# Patient Record
Sex: FEMALE | Race: OTHER | NOT HISPANIC OR LATINO
[De-identification: names, ages, dates, MRNs, and addresses within clinical notes are randomized per-mention and may not be internally consistent; named-entity substitution may affect disease eponyms.]

---

## 2017-08-09 ENCOUNTER — RESULT REVIEW (OUTPATIENT)
Age: 37
End: 2017-08-09

## 2017-10-04 ENCOUNTER — RESULT REVIEW (OUTPATIENT)
Age: 37
End: 2017-10-04

## 2018-06-01 ENCOUNTER — MEDICATION RENEWAL (OUTPATIENT)
Age: 38
End: 2018-06-01

## 2018-06-05 ENCOUNTER — MEDICATION RENEWAL (OUTPATIENT)
Age: 38
End: 2018-06-05

## 2018-11-12 ENCOUNTER — RX RENEWAL (OUTPATIENT)
Age: 38
End: 2018-11-12

## 2018-11-12 DIAGNOSIS — J02.9 ACUTE PHARYNGITIS, UNSPECIFIED: ICD-10-CM

## 2018-12-25 ENCOUNTER — TRANSCRIPTION ENCOUNTER (OUTPATIENT)
Age: 38
End: 2018-12-25

## 2019-01-02 RX ORDER — AMOXICILLIN 500 MG/1
500 TABLET, FILM COATED ORAL
Qty: 14 | Refills: 0 | Status: DISCONTINUED | COMMUNITY
Start: 2019-01-02 | End: 2019-01-02

## 2019-01-02 RX ORDER — AMOXICILLIN 500 MG/1
500 TABLET, FILM COATED ORAL
Qty: 2 | Refills: 0 | Status: DISCONTINUED | COMMUNITY
Start: 2019-01-02 | End: 2019-01-02

## 2019-06-10 ENCOUNTER — RX RENEWAL (OUTPATIENT)
Age: 39
End: 2019-06-10

## 2019-09-30 ENCOUNTER — RX RENEWAL (OUTPATIENT)
Age: 39
End: 2019-09-30

## 2019-10-31 ENCOUNTER — RESULT REVIEW (OUTPATIENT)
Age: 39
End: 2019-10-31

## 2020-04-25 ENCOUNTER — MESSAGE (OUTPATIENT)
Age: 40
End: 2020-04-25

## 2020-05-06 ENCOUNTER — APPOINTMENT (OUTPATIENT)
Dept: DISASTER EMERGENCY | Facility: CLINIC | Age: 40
End: 2020-05-06

## 2020-05-07 LAB
SARS-COV-2 IGG SERPL IA-ACNC: 1 AU/ML
SARS-COV-2 IGG SERPL QL IA: NEGATIVE

## 2020-10-21 ENCOUNTER — APPOINTMENT (OUTPATIENT)
Dept: ORTHOPEDIC SURGERY | Facility: CLINIC | Age: 40
End: 2020-10-21

## 2020-12-21 PROBLEM — J02.9 THROAT INFECTION: Status: RESOLVED | Noted: 2019-01-02 | Resolved: 2020-12-21

## 2020-12-22 ENCOUNTER — RESULT REVIEW (OUTPATIENT)
Age: 40
End: 2020-12-22

## 2021-03-24 ENCOUNTER — APPOINTMENT (OUTPATIENT)
Dept: MAMMOGRAPHY | Facility: HOSPITAL | Age: 41
End: 2021-03-24
Payer: COMMERCIAL

## 2021-03-24 ENCOUNTER — OUTPATIENT (OUTPATIENT)
Dept: OUTPATIENT SERVICES | Facility: HOSPITAL | Age: 41
LOS: 1 days | End: 2021-03-24
Payer: COMMERCIAL

## 2021-03-24 DIAGNOSIS — Z12.31 ENCOUNTER FOR SCREENING MAMMOGRAM FOR MALIGNANT NEOPLASM OF BREAST: ICD-10-CM

## 2021-03-24 PROCEDURE — 77067 SCR MAMMO BI INCL CAD: CPT

## 2021-03-24 PROCEDURE — 77063 BREAST TOMOSYNTHESIS BI: CPT

## 2021-03-24 PROCEDURE — 77063 BREAST TOMOSYNTHESIS BI: CPT | Mod: 26

## 2021-03-24 PROCEDURE — 77067 SCR MAMMO BI INCL CAD: CPT | Mod: 26

## 2021-04-02 ENCOUNTER — APPOINTMENT (OUTPATIENT)
Dept: MAMMOGRAPHY | Facility: HOSPITAL | Age: 41
End: 2021-04-02
Payer: COMMERCIAL

## 2021-04-02 ENCOUNTER — APPOINTMENT (OUTPATIENT)
Dept: ULTRASOUND IMAGING | Facility: HOSPITAL | Age: 41
End: 2021-04-02
Payer: COMMERCIAL

## 2021-04-02 ENCOUNTER — OUTPATIENT (OUTPATIENT)
Dept: OUTPATIENT SERVICES | Facility: HOSPITAL | Age: 41
LOS: 1 days | End: 2021-04-02
Payer: COMMERCIAL

## 2021-04-02 DIAGNOSIS — Z00.8 ENCOUNTER FOR OTHER GENERAL EXAMINATION: ICD-10-CM

## 2021-04-02 PROCEDURE — G0279: CPT | Mod: 26

## 2021-04-02 PROCEDURE — 76641 ULTRASOUND BREAST COMPLETE: CPT

## 2021-04-02 PROCEDURE — 77066 DX MAMMO INCL CAD BI: CPT | Mod: 26

## 2021-04-02 PROCEDURE — 77066 DX MAMMO INCL CAD BI: CPT

## 2021-04-02 PROCEDURE — 76641 ULTRASOUND BREAST COMPLETE: CPT | Mod: 26,50

## 2021-04-02 PROCEDURE — G0279: CPT

## 2021-05-10 ENCOUNTER — TRANSCRIPTION ENCOUNTER (OUTPATIENT)
Age: 41
End: 2021-05-10

## 2021-05-10 ENCOUNTER — APPOINTMENT (OUTPATIENT)
Dept: OPHTHALMOLOGY | Facility: CLINIC | Age: 41
End: 2021-05-10
Payer: COMMERCIAL

## 2021-05-10 ENCOUNTER — NON-APPOINTMENT (OUTPATIENT)
Age: 41
End: 2021-05-10

## 2021-05-10 PROCEDURE — 92002 INTRM OPH EXAM NEW PATIENT: CPT

## 2021-05-10 PROCEDURE — 99072 ADDL SUPL MATRL&STAF TM PHE: CPT

## 2021-06-25 DIAGNOSIS — L30.9 DERMATITIS, UNSPECIFIED: ICD-10-CM

## 2021-06-25 DIAGNOSIS — L81.1 CHLOASMA: ICD-10-CM

## 2021-06-25 RX ORDER — HYDROQUINONE 40 MG/G
4 CREAM TOPICAL TWICE DAILY
Qty: 1 | Refills: 0 | Status: ACTIVE | COMMUNITY
Start: 2021-06-25 | End: 1900-01-01

## 2021-06-25 RX ORDER — CLOBETASOL PROPIONATE 0.05 G/ML
0.05 SPRAY TOPICAL TWICE DAILY
Qty: 1 | Refills: 0 | Status: ACTIVE | COMMUNITY
Start: 2021-06-25 | End: 1900-01-01

## 2021-08-05 ENCOUNTER — APPOINTMENT (OUTPATIENT)
Dept: GASTROENTEROLOGY | Facility: CLINIC | Age: 41
End: 2021-08-05
Payer: COMMERCIAL

## 2021-08-05 VITALS
HEIGHT: 66 IN | HEART RATE: 81 BPM | DIASTOLIC BLOOD PRESSURE: 68 MMHG | WEIGHT: 151 LBS | SYSTOLIC BLOOD PRESSURE: 100 MMHG | OXYGEN SATURATION: 99 % | TEMPERATURE: 97.8 F | BODY MASS INDEX: 24.27 KG/M2

## 2021-08-05 DIAGNOSIS — R11.0 NAUSEA: ICD-10-CM

## 2021-08-05 PROCEDURE — 99203 OFFICE O/P NEW LOW 30 MIN: CPT

## 2021-08-05 RX ORDER — AZELASTINE HYDROCHLORIDE 137 UG/1
137 SPRAY, METERED NASAL
Qty: 1 | Refills: 3 | Status: DISCONTINUED | COMMUNITY
Start: 2019-09-30 | End: 2021-08-05

## 2021-08-05 RX ORDER — MULTIVIT-MIN/IRON/FOLIC ACID/K 18-600-40
CAPSULE ORAL
Refills: 0 | Status: ACTIVE | COMMUNITY

## 2021-08-05 RX ORDER — AMOXICILLIN 500 MG/1
500 TABLET, FILM COATED ORAL 3 TIMES DAILY
Qty: 21 | Refills: 0 | Status: DISCONTINUED | COMMUNITY
Start: 2019-01-02 | End: 2021-08-05

## 2021-08-05 RX ORDER — CHOLECALCIFEROL (VITAMIN D3) 25 MCG
TABLET ORAL
Refills: 0 | Status: ACTIVE | COMMUNITY

## 2021-08-05 RX ORDER — COLD-HOT PACK
EACH MISCELLANEOUS
Refills: 0 | Status: ACTIVE | COMMUNITY

## 2021-08-05 RX ORDER — AZITHROMYCIN 250 MG/1
250 TABLET, FILM COATED ORAL
Qty: 1 | Refills: 0 | Status: DISCONTINUED | OUTPATIENT
Start: 2018-06-01 | End: 2021-08-05

## 2021-08-05 RX ORDER — AZITHROMYCIN 250 MG/1
250 TABLET, FILM COATED ORAL
Qty: 1 | Refills: 0 | Status: DISCONTINUED | COMMUNITY
Start: 2018-06-05 | End: 2021-08-05

## 2021-08-05 RX ORDER — AZELASTINE HYDROCHLORIDE 137 UG/1
0.1 SPRAY, METERED NASAL TWICE DAILY
Qty: 30 | Refills: 0 | Status: DISCONTINUED | COMMUNITY
Start: 2018-01-19 | End: 2021-08-05

## 2021-08-05 RX ORDER — FLUCONAZOLE 150 MG/1
150 TABLET ORAL
Qty: 3 | Refills: 0 | Status: DISCONTINUED | COMMUNITY
Start: 2018-11-12 | End: 2021-08-05

## 2021-08-06 PROBLEM — R11.0 NAUSEA: Status: ACTIVE | Noted: 2021-08-06

## 2021-08-06 NOTE — ASSESSMENT
[FreeTextEntry1] : Patient with worsened symptoms of acid reflux including burning, nausea, and globus sensation.\par \par A list of dietary and lifestyle modifications in the treatment of GERD was given to the patient.\par \par The patient was empirically started on pantoprazole 40 mg a day.\par \par An EGD has been scheduled. The risks, benefits, alternatives, and limitations of the procedure were explained.

## 2021-08-06 NOTE — HISTORY OF PRESENT ILLNESS
[FreeTextEntry1] : The patient is a 40-year-old woman who has had symptoms of reflux in the past but has had worsened symptoms dating back for about a month.  She has had epigastric burning that extends up her throat.  She takes Tums with minimal relief.  She also notes nausea but no vomiting and a globus sensation.  She denies dysphagia.  Bowel movements are normal.  She moves her bowels every other day which is a mild change from previously when she was moving her bowels daily.  She denies melena or bright red blood per rectum.  The patient's weight is stable.  The patient has not been admitted to the hospital in the past year and denies any cardiac issues.

## 2021-08-09 DIAGNOSIS — R19.4 CHANGE IN BOWEL HABIT: ICD-10-CM

## 2021-08-09 DIAGNOSIS — Z20.822 CONTACT WITH AND (SUSPECTED) EXPOSURE TO COVID-19: ICD-10-CM

## 2021-08-09 DIAGNOSIS — Z01.818 ENCOUNTER FOR OTHER PREPROCEDURAL EXAMINATION: ICD-10-CM

## 2021-08-31 LAB — SARS-COV-2 N GENE NPH QL NAA+PROBE: NOT DETECTED

## 2021-09-02 ENCOUNTER — APPOINTMENT (OUTPATIENT)
Dept: GASTROENTEROLOGY | Facility: AMBULATORY MEDICAL SERVICES | Age: 41
End: 2021-09-02
Payer: COMMERCIAL

## 2021-09-02 PROCEDURE — 43239 EGD BIOPSY SINGLE/MULTIPLE: CPT | Mod: 59

## 2021-10-26 ENCOUNTER — APPOINTMENT (OUTPATIENT)
Dept: GASTROENTEROLOGY | Facility: CLINIC | Age: 41
End: 2021-10-26
Payer: COMMERCIAL

## 2021-10-26 VITALS
DIASTOLIC BLOOD PRESSURE: 60 MMHG | OXYGEN SATURATION: 99 % | SYSTOLIC BLOOD PRESSURE: 112 MMHG | WEIGHT: 156 LBS | TEMPERATURE: 97.3 F | HEIGHT: 66 IN | HEART RATE: 71 BPM | BODY MASS INDEX: 25.07 KG/M2

## 2021-10-26 DIAGNOSIS — K21.9 GASTRO-ESOPHAGEAL REFLUX DISEASE W/OUT ESOPHAGITIS: ICD-10-CM

## 2021-10-26 PROCEDURE — 99213 OFFICE O/P EST LOW 20 MIN: CPT

## 2021-10-27 NOTE — ASSESSMENT
[FreeTextEntry1] : Patient with biopsy evidence of reflux esophagitis who is doing better on pantoprazole 40 mg a day.\par \par We will decrease pantoprazole to 20 mg a day.\par \par Patient will call me if her symptoms return on the lower dosage.  Otherwise, she will return to see me in 6 months.\par \par Patient is due for colonoscopy in 5 to 10 years.\par \par \par Plan from 8/5/2021 - Patient with worsened symptoms of acid reflux including burning, nausea, and globus sensation.\par \par A list of dietary and lifestyle modifications in the treatment of GERD was given to the patient.\par \par The patient was empirically started on pantoprazole 40 mg a day.\par \par An EGD has been scheduled. The risks, benefits, alternatives, and limitations of the procedure were explained.

## 2021-10-27 NOTE — HISTORY OF PRESENT ILLNESS
[FreeTextEntry1] : We reviewed the patient's EGD and colonoscopy performed on September 2, 2021.  EGD was grossly normal with biopsies showing evidence of reflux esophagitis.  Colonoscopy was normal.  The patient has been on pantoprazole 40 mg a day and is feeling better.  She denies heartburn or dysphagia.  She no longer has a globus sensation or throat burning.  She denies epigastric pain.  Bowel movements are normal with 1 bowel movement every 2 days.  She denies melena or bright red blood per rectum.  Patient has cut down her alcohol intake.\par \par \par Note from 8/5/2021 - The patient is a 40-year-old woman who has had symptoms of reflux in the past but has had worsened symptoms dating back for about a month.  She has had epigastric burning that extends up her throat.  She takes Tums with minimal relief.  She also notes nausea but no vomiting and a globus sensation.  She denies dysphagia.  Bowel movements are normal.  She moves her bowels every other day which is a mild change from previously when she was moving her bowels daily.  She denies melena or bright red blood per rectum.  The patient's weight is stable.  The patient has not been admitted to the hospital in the past year and denies any cardiac issues.

## 2021-10-29 ENCOUNTER — APPOINTMENT (OUTPATIENT)
Dept: ULTRASOUND IMAGING | Facility: CLINIC | Age: 41
End: 2021-10-29
Payer: COMMERCIAL

## 2021-10-29 PROCEDURE — 76642 ULTRASOUND BREAST LIMITED: CPT | Mod: 50

## 2022-04-19 ENCOUNTER — TRANSCRIPTION ENCOUNTER (OUTPATIENT)
Age: 42
End: 2022-04-19

## 2022-04-20 ENCOUNTER — NON-APPOINTMENT (OUTPATIENT)
Age: 42
End: 2022-04-20

## 2022-04-28 ENCOUNTER — APPOINTMENT (OUTPATIENT)
Dept: GASTROENTEROLOGY | Facility: CLINIC | Age: 42
End: 2022-04-28
Payer: COMMERCIAL

## 2022-04-28 VITALS
SYSTOLIC BLOOD PRESSURE: 100 MMHG | WEIGHT: 152 LBS | OXYGEN SATURATION: 98 % | BODY MASS INDEX: 24.43 KG/M2 | TEMPERATURE: 97.7 F | HEART RATE: 72 BPM | HEIGHT: 66 IN | DIASTOLIC BLOOD PRESSURE: 56 MMHG

## 2022-04-28 PROCEDURE — 99213 OFFICE O/P EST LOW 20 MIN: CPT

## 2022-04-28 RX ORDER — PANTOPRAZOLE 40 MG/1
40 TABLET, DELAYED RELEASE ORAL
Qty: 90 | Refills: 1 | Status: DISCONTINUED | COMMUNITY
Start: 2021-08-05 | End: 2022-04-28

## 2022-04-28 RX ORDER — SODIUM PICOSULFATE, MAGNESIUM OXIDE, AND ANHYDROUS CITRIC ACID 10; 3.5; 12 MG/160ML; G/160ML; G/160ML
10-3.5-12 MG-GM LIQUID ORAL
Qty: 1 | Refills: 0 | Status: DISCONTINUED | COMMUNITY
Start: 2021-08-09 | End: 2022-04-28

## 2022-04-29 NOTE — ASSESSMENT
[FreeTextEntry1] : Patient with a history of reflux esophagitis she was doing well on pantoprazole 20 mg a day.\par \par We will continue pantoprazole 20 mg a day.\par \par Patient will return to see me in 6 months.  At that time, if she is doing well, we will consider stopping the medication.\par \par Patient is due for colonoscopy in September 2026.\par \par \par Plan from 10/26/2021 - Patient with biopsy evidence of reflux esophagitis who is doing better on pantoprazole 40 mg a day.\par \par We will decrease pantoprazole to 20 mg a day.\par \par Patient will call me if her symptoms return on the lower dosage.  Otherwise, she will return to see me in 6 months.\par \par Patient is due for colonoscopy in 5 to 10 years.\par \par \par Plan from 8/5/2021 - Patient with worsened symptoms of acid reflux including burning, nausea, and globus sensation.\par \par A list of dietary and lifestyle modifications in the treatment of GERD was given to the patient.\par \par The patient was empirically started on pantoprazole 40 mg a day.\par \par An EGD has been scheduled. The risks, benefits, alternatives, and limitations of the procedure were explained.

## 2022-04-29 NOTE — HISTORY OF PRESENT ILLNESS
[FreeTextEntry1] : The patient has a history of reflux esophagitis and has been on pantoprazole 20 mg a day.  She gets minimal heartburn which is only worse when she has significant stress.  She denies dysphagia or abdominal pain.  She had a stomach virus last week with vomiting, fever, chills that lasted about 24 hours and resolved.  She states that she had a COVID test that was negative.  Her bowel movements are normal.\par \par \par Note from 10/26/2021 - We reviewed the patient's EGD and colonoscopy performed on September 2, 2021.  EGD was grossly normal with biopsies showing evidence of reflux esophagitis.  Colonoscopy was normal.  The patient has been on pantoprazole 40 mg a day and is feeling better.  She denies heartburn or dysphagia.  She no longer has a globus sensation or throat burning.  She denies epigastric pain.  Bowel movements are normal with 1 bowel movement every 2 days.  She denies melena or bright red blood per rectum.  Patient has cut down her alcohol intake.\par \par \par Note from 8/5/2021 - The patient is a 40-year-old woman who has had symptoms of reflux in the past but has had worsened symptoms dating back for about a month.  She has had epigastric burning that extends up her throat.  She takes Tums with minimal relief.  She also notes nausea but no vomiting and a globus sensation.  She denies dysphagia.  Bowel movements are normal.  She moves her bowels every other day which is a mild change from previously when she was moving her bowels daily.  She denies melena or bright red blood per rectum.  The patient's weight is stable.  The patient has not been admitted to the hospital in the past year and denies any cardiac issues.

## 2022-05-08 ENCOUNTER — NON-APPOINTMENT (OUTPATIENT)
Age: 42
End: 2022-05-08

## 2022-05-13 RX ORDER — PANTOPRAZOLE 20 MG/1
20 TABLET, DELAYED RELEASE ORAL
Qty: 90 | Refills: 2 | Status: ACTIVE | COMMUNITY
Start: 2021-10-26 | End: 1900-01-01

## 2022-05-20 RX ORDER — METHYLPREDNISOLONE 4 MG/1
4 TABLET ORAL
Qty: 1 | Refills: 1 | Status: ACTIVE | COMMUNITY
Start: 2022-05-20 | End: 1900-01-01

## 2022-06-05 ENCOUNTER — NON-APPOINTMENT (OUTPATIENT)
Age: 42
End: 2022-06-05

## 2022-10-27 ENCOUNTER — APPOINTMENT (OUTPATIENT)
Dept: GASTROENTEROLOGY | Facility: CLINIC | Age: 42
End: 2022-10-27

## 2022-10-27 VITALS
TEMPERATURE: 97.5 F | DIASTOLIC BLOOD PRESSURE: 84 MMHG | HEIGHT: 66 IN | BODY MASS INDEX: 25.78 KG/M2 | SYSTOLIC BLOOD PRESSURE: 118 MMHG | WEIGHT: 160.4 LBS

## 2022-10-27 PROCEDURE — 99213 OFFICE O/P EST LOW 20 MIN: CPT

## 2022-10-27 NOTE — PHYSICAL EXAM
[General Appearance - Alert] : alert [General Appearance - In No Acute Distress] : in no acute distress [Neck Appearance] : the appearance of the neck was normal [Neck Cervical Mass (___cm)] : no neck mass was observed [Jugular Venous Distention Increased] : there was no jugular-venous distention [Thyroid Diffuse Enlargement] : the thyroid was not enlarged [Thyroid Nodule] : there were no palpable thyroid nodules [Auscultation Breath Sounds / Voice Sounds] : lungs were clear to auscultation bilaterally [Heart Rate And Rhythm] : heart rate was normal and rhythm regular [Heart Sounds] : normal S1 and S2 [Heart Sounds Gallop] : no gallops [Murmurs] : no murmurs [Edema] : there was no peripheral edema [Heart Sounds Pericardial Friction Rub] : no pericardial rub [Bowel Sounds] : normal bowel sounds [Abdomen Soft] : soft [Abdomen Tenderness] : non-tender [] : no hepato-splenomegaly [Abdomen Mass (___ Cm)] : no abdominal mass palpated [No CVA Tenderness] : no ~M costovertebral angle tenderness [No Spinal Tenderness] : no spinal tenderness [Oriented To Time, Place, And Person] : oriented to person, place, and time [Impaired Insight] : insight and judgment were intact [Affect] : the affect was normal

## 2022-10-29 NOTE — HISTORY OF PRESENT ILLNESS
[FreeTextEntry1] : The patient has a history of reflux esophagitis.  She is on pantoprazole 20 mg a day.  She denies heartburn or dysphagia.  She sometimes feels that something in the neck is pushing on her esophagus when swallowing.  She only notices this about once a month.  She denies abdominal pain.  Bowel movements are normal without melena or bright red blood per rectum.  The patient has gained 8-1/2 pounds.\par \par \par Note from 4/28/2022 - The patient has a history of reflux esophagitis and has been on pantoprazole 20 mg a day.  She gets minimal heartburn which is only worse when she has significant stress.  She denies dysphagia or abdominal pain.  She had a stomach virus last week with vomiting, fever, chills that lasted about 24 hours and resolved.  She states that she had a COVID test that was negative.  Her bowel movements are normal.\par \par \par Note from 10/26/2021 - We reviewed the patient's EGD and colonoscopy performed on September 2, 2021.  EGD was grossly normal with biopsies showing evidence of reflux esophagitis.  Colonoscopy was normal.  The patient has been on pantoprazole 40 mg a day and is feeling better.  She denies heartburn or dysphagia.  She no longer has a globus sensation or throat burning.  She denies epigastric pain.  Bowel movements are normal with 1 bowel movement every 2 days.  She denies melena or bright red blood per rectum.  Patient has cut down her alcohol intake.\par \par \par Note from 8/5/2021 - The patient is a 40-year-old woman who has had symptoms of reflux in the past but has had worsened symptoms dating back for about a month.  She has had epigastric burning that extends up her throat.  She takes Tums with minimal relief.  She also notes nausea but no vomiting and a globus sensation.  She denies dysphagia.  Bowel movements are normal.  She moves her bowels every other day which is a mild change from previously when she was moving her bowels daily.  She denies melena or bright red blood per rectum.  The patient's weight is stable.  The patient has not been admitted to the hospital in the past year and denies any cardiac issues.

## 2022-10-29 NOTE — ASSESSMENT
[FreeTextEntry1] : Patient with a history of reflux esophagitis who was doing well on pantoprazole 20 mg a day.  She has gained some weight.  She has a feeling that something is pushing on her esophagus.\par \par A list of dietary and lifestyle modifications in the treatment of GERD was given to the patient.\par \par We discussed the importance of weight loss, especially if we are going to try to take her off of pantoprazole.  In the meantime, we will continue pantoprazole 20 mg a day.\par \par Patient was sent for an ultrasound of the neck to evaluate for thyroid mass or lymph node compressing on the esophagus.\par \par Patient will return to see me in March.  If she is successful with losing the weight and feels well, we will try to stop pantoprazole.\par \par Patient is due for colonoscopy in September 2026.\par \par \par Plan from 4/28/2022 - Patient with a history of reflux esophagitis she was doing well on pantoprazole 20 mg a day.\par \par We will continue pantoprazole 20 mg a day.\par \par Patient will return to see me in 6 months.  At that time, if she is doing well, we will consider stopping the medication.\par \par Patient is due for colonoscopy in September 2026.\par \par \par Plan from 10/26/2021 - Patient with biopsy evidence of reflux esophagitis who is doing better on pantoprazole 40 mg a day.\par \par We will decrease pantoprazole to 20 mg a day.\par \par Patient will call me if her symptoms return on the lower dosage.  Otherwise, she will return to see me in 6 months.\par \par Patient is due for colonoscopy in 5 to 10 years.\par \par \par Plan from 8/5/2021 - Patient with worsened symptoms of acid reflux including burning, nausea, and globus sensation.\par \par A list of dietary and lifestyle modifications in the treatment of GERD was given to the patient.\par \par The patient was empirically started on pantoprazole 40 mg a day.\par \par An EGD has been scheduled. The risks, benefits, alternatives, and limitations of the procedure were explained.

## 2022-11-02 ENCOUNTER — APPOINTMENT (OUTPATIENT)
Dept: ULTRASOUND IMAGING | Facility: CLINIC | Age: 42
End: 2022-11-02

## 2022-11-02 ENCOUNTER — OUTPATIENT (OUTPATIENT)
Dept: OUTPATIENT SERVICES | Facility: HOSPITAL | Age: 42
LOS: 1 days | End: 2022-11-02
Payer: COMMERCIAL

## 2022-11-02 DIAGNOSIS — K21.00 GASTRO-ESOPHAGEAL REFLUX DISEASE WITH ESOPHAGITIS, WITHOUT BLEEDING: ICD-10-CM

## 2022-11-02 PROCEDURE — 76536 US EXAM OF HEAD AND NECK: CPT | Mod: 26

## 2022-11-02 PROCEDURE — 76536 US EXAM OF HEAD AND NECK: CPT

## 2022-11-03 ENCOUNTER — NON-APPOINTMENT (OUTPATIENT)
Age: 42
End: 2022-11-03

## 2022-11-07 ENCOUNTER — RESULT REVIEW (OUTPATIENT)
Age: 42
End: 2022-11-07

## 2022-11-08 ENCOUNTER — APPOINTMENT (OUTPATIENT)
Dept: OBGYN | Facility: CLINIC | Age: 42
End: 2022-11-08

## 2022-11-08 PROCEDURE — 99386 PREV VISIT NEW AGE 40-64: CPT | Mod: 25

## 2022-11-08 PROCEDURE — 81025 URINE PREGNANCY TEST: CPT

## 2022-11-08 PROCEDURE — 81002 URINALYSIS NONAUTO W/O SCOPE: CPT

## 2022-12-26 ENCOUNTER — APPOINTMENT (OUTPATIENT)
Dept: MAMMOGRAPHY | Facility: CLINIC | Age: 42
End: 2022-12-26
Payer: COMMERCIAL

## 2022-12-26 ENCOUNTER — APPOINTMENT (OUTPATIENT)
Dept: ULTRASOUND IMAGING | Facility: CLINIC | Age: 42
End: 2022-12-26
Payer: COMMERCIAL

## 2022-12-26 ENCOUNTER — OUTPATIENT (OUTPATIENT)
Dept: OUTPATIENT SERVICES | Facility: HOSPITAL | Age: 42
LOS: 1 days | End: 2022-12-26
Payer: COMMERCIAL

## 2022-12-26 DIAGNOSIS — Z12.39 ENCOUNTER FOR OTHER SCREENING FOR MALIGNANT NEOPLASM OF BREAST: ICD-10-CM

## 2022-12-26 PROCEDURE — 77063 BREAST TOMOSYNTHESIS BI: CPT | Mod: 26

## 2022-12-26 PROCEDURE — 76641 ULTRASOUND BREAST COMPLETE: CPT | Mod: 26,50

## 2022-12-26 PROCEDURE — 77067 SCR MAMMO BI INCL CAD: CPT | Mod: 26

## 2022-12-26 PROCEDURE — 77067 SCR MAMMO BI INCL CAD: CPT

## 2022-12-26 PROCEDURE — 76641 ULTRASOUND BREAST COMPLETE: CPT

## 2022-12-26 PROCEDURE — 77063 BREAST TOMOSYNTHESIS BI: CPT

## 2023-03-21 ENCOUNTER — APPOINTMENT (OUTPATIENT)
Dept: GASTROENTEROLOGY | Facility: CLINIC | Age: 43
End: 2023-03-21
Payer: COMMERCIAL

## 2023-03-21 VITALS
WEIGHT: 159 LBS | HEIGHT: 66 IN | SYSTOLIC BLOOD PRESSURE: 120 MMHG | BODY MASS INDEX: 25.55 KG/M2 | TEMPERATURE: 97.3 F | DIASTOLIC BLOOD PRESSURE: 70 MMHG | HEART RATE: 69 BPM | OXYGEN SATURATION: 99 %

## 2023-03-21 DIAGNOSIS — R09.89 OTHER SPECIFIED SYMPTOMS AND SIGNS INVOLVING THE CIRCULATORY AND RESPIRATORY SYSTEMS: ICD-10-CM

## 2023-03-21 DIAGNOSIS — K21.00 GASTRO-ESOPHAGEAL REFLUX DISEASE WITH ESOPHAGITIS, WITHOUT BLEEDING: ICD-10-CM

## 2023-03-21 PROCEDURE — 99213 OFFICE O/P EST LOW 20 MIN: CPT

## 2023-03-22 NOTE — ASSESSMENT
[FreeTextEntry1] : Patient with a history of reflux esophagitis who is doing well off of pantoprazole.  She gets a mild intermittent globus sensation.\par \par Given her paucity of symptoms, we will keep her off of standing medication.\par \par A list of dietary and lifestyle modifications in the treatment of GERD was given to the patient.  We discussed this in depth.  Patient was advised to see an ENT doctor for evaluation for other reasons for the globus sensation.\par \par Patient is due for colonoscopy in September 2026.\par \par \par Plan from 10/27/2022 - Patient with a history of reflux esophagitis who was doing well on pantoprazole 20 mg a day.  She has gained some weight.  She has a feeling that something is pushing on her esophagus.\par \par A list of dietary and lifestyle modifications in the treatment of GERD was given to the patient.\par \par We discussed the importance of weight loss, especially if we are going to try to take her off of pantoprazole.  In the meantime, we will continue pantoprazole 20 mg a day.\par \par Patient was sent for an ultrasound of the neck to evaluate for thyroid mass or lymph node compressing on the esophagus.\par \par Patient will return to see me in March.  If she is successful with losing the weight and feels well, we will try to stop pantoprazole.\par \par Patient is due for colonoscopy in September 2026.\par \par \par Plan from 4/28/2022 - Patient with a history of reflux esophagitis she was doing well on pantoprazole 20 mg a day.\par \par We will continue pantoprazole 20 mg a day.\par \par Patient will return to see me in 6 months.  At that time, if she is doing well, we will consider stopping the medication.\par \par Patient is due for colonoscopy in September 2026.\par \par \par Plan from 10/26/2021 - Patient with biopsy evidence of reflux esophagitis who is doing better on pantoprazole 40 mg a day.\par \par We will decrease pantoprazole to 20 mg a day.\par \par Patient will call me if her symptoms return on the lower dosage.  Otherwise, she will return to see me in 6 months.\par \par Patient is due for colonoscopy in 5 to 10 years.\par \par \par Plan from 8/5/2021 - Patient with worsened symptoms of acid reflux including burning, nausea, and globus sensation.\par \par A list of dietary and lifestyle modifications in the treatment of GERD was given to the patient.\par \par The patient was empirically started on pantoprazole 40 mg a day.\par \par An EGD has been scheduled. The risks, benefits, alternatives, and limitations of the procedure were explained.

## 2023-03-22 NOTE — HISTORY OF PRESENT ILLNESS
[FreeTextEntry1] : The patient has a history of reflux esophagitis.  She stopped taking pantoprazole 20 mg a day at 1 month ago.  She is feeling generally well.  She denies heartburn, dysphagia, nausea, vomiting, abdominal pain.  She has an intermittent mild globus sensation.  Bowel movements are normal without melena or bright red blood per rectum.  She has had mild weight loss which is intentional.  The patient had a normal thyroid ultrasound on November 2, 2022.\par \par \par Note from 10/27/2022 - The patient has a history of reflux esophagitis.  She is on pantoprazole 20 mg a day.  She denies heartburn or dysphagia.  She sometimes feels that something in the neck is pushing on her esophagus when swallowing.  She only notices this about once a month.  She denies abdominal pain.  Bowel movements are normal without melena or bright red blood per rectum.  The patient has gained 8-1/2 pounds.\par \par \par Note from 4/28/2022 - The patient has a history of reflux esophagitis and has been on pantoprazole 20 mg a day.  She gets minimal heartburn which is only worse when she has significant stress.  She denies dysphagia or abdominal pain.  She had a stomach virus last week with vomiting, fever, chills that lasted about 24 hours and resolved.  She states that she had a COVID test that was negative.  Her bowel movements are normal.\par \par \par Note from 10/26/2021 - We reviewed the patient's EGD and colonoscopy performed on September 2, 2021.  EGD was grossly normal with biopsies showing evidence of reflux esophagitis.  Colonoscopy was normal.  The patient has been on pantoprazole 40 mg a day and is feeling better.  She denies heartburn or dysphagia.  She no longer has a globus sensation or throat burning.  She denies epigastric pain.  Bowel movements are normal with 1 bowel movement every 2 days.  She denies melena or bright red blood per rectum.  Patient has cut down her alcohol intake.\par \par \par Note from 8/5/2021 - The patient is a 40-year-old woman who has had symptoms of reflux in the past but has had worsened symptoms dating back for about a month.  She has had epigastric burning that extends up her throat.  She takes Tums with minimal relief.  She also notes nausea but no vomiting and a globus sensation.  She denies dysphagia.  Bowel movements are normal.  She moves her bowels every other day which is a mild change from previously when she was moving her bowels daily.  She denies melena or bright red blood per rectum.  The patient's weight is stable.  The patient has not been admitted to the hospital in the past year and denies any cardiac issues.

## 2023-05-23 ENCOUNTER — APPOINTMENT (OUTPATIENT)
Dept: OBGYN | Facility: CLINIC | Age: 43
End: 2023-05-23
Payer: COMMERCIAL

## 2023-05-23 PROCEDURE — 99213 OFFICE O/P EST LOW 20 MIN: CPT | Mod: 25

## 2023-05-23 PROCEDURE — 81002 URINALYSIS NONAUTO W/O SCOPE: CPT

## 2023-11-14 ENCOUNTER — APPOINTMENT (OUTPATIENT)
Dept: OBGYN | Facility: CLINIC | Age: 43
End: 2023-11-14
Payer: COMMERCIAL

## 2023-11-14 PROCEDURE — 36415 COLL VENOUS BLD VENIPUNCTURE: CPT

## 2023-11-14 PROCEDURE — 99396 PREV VISIT EST AGE 40-64: CPT | Mod: 25

## 2023-11-14 PROCEDURE — 81002 URINALYSIS NONAUTO W/O SCOPE: CPT

## 2023-12-26 ENCOUNTER — APPOINTMENT (OUTPATIENT)
Dept: DERMATOLOGY | Facility: CLINIC | Age: 43
End: 2023-12-26
Payer: COMMERCIAL

## 2023-12-26 DIAGNOSIS — L81.4 OTHER MELANIN HYPERPIGMENTATION: ICD-10-CM

## 2023-12-26 DIAGNOSIS — L82.1 OTHER SEBORRHEIC KERATOSIS: ICD-10-CM

## 2023-12-26 DIAGNOSIS — D22.9 MELANOCYTIC NEVI, UNSPECIFIED: ICD-10-CM

## 2023-12-26 DIAGNOSIS — Z12.83 ENCOUNTER FOR SCREENING FOR MALIGNANT NEOPLASM OF SKIN: ICD-10-CM

## 2023-12-26 PROCEDURE — 99203 OFFICE O/P NEW LOW 30 MIN: CPT

## 2023-12-26 NOTE — ASSESSMENT
[FreeTextEntry1] : #SK #nevi - Counseled about clinically and dermatoscopically benign lesions - No treatment indicated at this time - Counseled patient to notify us of any changes #lentigines emphasized importance of sun protection with broad spectrum sunscreen at least SPF 30, sun protective clothing/hats  - TBSE performed today - Advised sun protection, broad spectrum SPF 30 or higher daily and reapply often, sun protective clothing - Counseled patient to monitor for changes - rtc q1yr for repeat skin exam or sooner if new/concerning lesion

## 2023-12-26 NOTE — PHYSICAL EXAM
[FreeTextEntry3] : The patient is well-appearing, in no acute distress, alert and oriented x 3. Mood and affect are normal. A complete cutaneous examination of the scalp, face, neck, chest, abdomen, back, bilateral arms, bilateral legs, buttocks, digits, nails, eyelids, conjunctiva and lips reveals the following significant findings:  brown macule on L nasal tip lentigines in photodistributed areas multiple homogenous brown macules and papules on face, trunk, extremities waxy stuck on papules on trunk

## 2023-12-26 NOTE — HISTORY OF PRESENT ILLNESS
[FreeTextEntry1] : np spot on nose [de-identified] : Ms. MURPHY LEDESMA  is a 43 year old F here for evaluation of below  #spot on nose x months #multiple moles on body  no personal or family h/o skin cancer Social Hx: [place] lots of time on beach

## 2024-01-17 ENCOUNTER — APPOINTMENT (OUTPATIENT)
Dept: MAMMOGRAPHY | Facility: IMAGING CENTER | Age: 44
End: 2024-01-17
Payer: COMMERCIAL

## 2024-01-17 ENCOUNTER — APPOINTMENT (OUTPATIENT)
Dept: ULTRASOUND IMAGING | Facility: IMAGING CENTER | Age: 44
End: 2024-01-17
Payer: COMMERCIAL

## 2024-01-17 ENCOUNTER — OUTPATIENT (OUTPATIENT)
Dept: OUTPATIENT SERVICES | Facility: HOSPITAL | Age: 44
LOS: 1 days | End: 2024-01-17
Payer: COMMERCIAL

## 2024-01-17 DIAGNOSIS — Z00.8 ENCOUNTER FOR OTHER GENERAL EXAMINATION: ICD-10-CM

## 2024-01-17 PROCEDURE — 77063 BREAST TOMOSYNTHESIS BI: CPT

## 2024-01-17 PROCEDURE — 77063 BREAST TOMOSYNTHESIS BI: CPT | Mod: 26

## 2024-01-17 PROCEDURE — 76641 ULTRASOUND BREAST COMPLETE: CPT | Mod: 26,50

## 2024-01-17 PROCEDURE — 77067 SCR MAMMO BI INCL CAD: CPT | Mod: 26

## 2024-01-17 PROCEDURE — 76641 ULTRASOUND BREAST COMPLETE: CPT

## 2024-01-17 PROCEDURE — 77067 SCR MAMMO BI INCL CAD: CPT

## 2024-01-22 ENCOUNTER — NON-APPOINTMENT (OUTPATIENT)
Age: 44
End: 2024-01-22

## 2024-01-23 ENCOUNTER — APPOINTMENT (OUTPATIENT)
Dept: INTERNAL MEDICINE | Facility: CLINIC | Age: 44
End: 2024-01-23
Payer: COMMERCIAL

## 2024-01-23 VITALS
OXYGEN SATURATION: 98 % | DIASTOLIC BLOOD PRESSURE: 76 MMHG | BODY MASS INDEX: 26.52 KG/M2 | TEMPERATURE: 98.3 F | HEIGHT: 66 IN | HEART RATE: 73 BPM | SYSTOLIC BLOOD PRESSURE: 119 MMHG | WEIGHT: 165 LBS

## 2024-01-23 DIAGNOSIS — Z82.61 FAMILY HISTORY OF ARTHRITIS: ICD-10-CM

## 2024-01-23 DIAGNOSIS — Z80.0 FAMILY HISTORY OF MALIGNANT NEOPLASM OF DIGESTIVE ORGANS: ICD-10-CM

## 2024-01-23 DIAGNOSIS — M25.541 PAIN IN JOINTS OF RIGHT HAND: ICD-10-CM

## 2024-01-23 DIAGNOSIS — M25.542 PAIN IN JOINTS OF RIGHT HAND: ICD-10-CM

## 2024-01-23 DIAGNOSIS — N94.6 DYSMENORRHEA, UNSPECIFIED: ICD-10-CM

## 2024-01-23 DIAGNOSIS — Z00.00 ENCOUNTER FOR GENERAL ADULT MEDICAL EXAMINATION W/OUT ABNORMAL FINDINGS: ICD-10-CM

## 2024-01-23 PROCEDURE — 99386 PREV VISIT NEW AGE 40-64: CPT

## 2024-01-23 NOTE — ASSESSMENT
[FreeTextEntry1] : 1) CPE  - diet / exercise discussed  - check labs - colonoscopy - UTD  mammo / Tdap PAP - UTD by hx   2) arthralgia  - possible reactive bc viral infection vs RA - check ESR RF citrulline antibodies  - ct OTC advil

## 2024-01-23 NOTE — HEALTH RISK ASSESSMENT
[Good] : ~his/her~  mood as  good [Yes] : Yes [Little interest or pleasure doing things] : 1) Little interest or pleasure doing things [Monthly or less (1 pt)] : Monthly or less (1 point) [Feeling down, depressed, or hopeless] : 2) Feeling down, depressed, or hopeless [PHQ-2 Negative - No further assessment needed] : PHQ-2 Negative - No further assessment needed [0] : 2) Feeling down, depressed, or hopeless: Not at all (0) [de-identified] : walks [[id number]] : 0 [de-identified] : regular  [Patient reported mammogram was normal] : Patient reported mammogram was normal [Patient reported colonoscopy was normal] : Patient reported colonoscopy was normal [Change in mental status noted] : No change in mental status noted [None] : None [Alone] : lives alone [Employed] : employed [Single] : single [Sexually Active] : sexually active [Feels Safe at Home] : Feels safe at home [Reports changes in hearing] : Reports no changes in hearing [Reports changes in vision] : Reports no changes in vision [Reports changes in dental health] : Reports no changes in dental health [Smoke Detector] : smoke detector [Carbon Monoxide Detector] : carbon monoxide detector [Safety elements used in home] : safety elements used in home [Seat Belt] :  uses seat belt [MammogramDate] : 2023 [MammogramComments] : nl by blessing  [ColonoscopyDate] : 2021 [Never] : Never

## 2024-01-23 NOTE — PHYSICAL EXAM
[No Acute Distress] : no acute distress [Well Nourished] : well nourished [Normal Voice/Communication] : normal voice/communication [Normal Outer Ear/Nose] : the outer ears and nose were normal in appearance [Normal Sclera/Conjunctiva] : normal sclera/conjunctiva [Normal] : normal gait, coordination grossly intact, no focal deficits and deep tendon reflexes were 2+ and symmetric

## 2024-01-23 NOTE — PHYSICAL EXAM
[No Acute Distress] : no acute distress [Normal Voice/Communication] : normal voice/communication [Well Nourished] : well nourished [Normal Sclera/Conjunctiva] : normal sclera/conjunctiva [Normal Outer Ear/Nose] : the outer ears and nose were normal in appearance [Normal] : normal gait, coordination grossly intact, no focal deficits and deep tendon reflexes were 2+ and symmetric

## 2024-01-23 NOTE — HEALTH RISK ASSESSMENT
[Good] : ~his/her~  mood as  good [Yes] : Yes [Little interest or pleasure doing things] : 1) Little interest or pleasure doing things [Monthly or less (1 pt)] : Monthly or less (1 point) [Feeling down, depressed, or hopeless] : 2) Feeling down, depressed, or hopeless [0] : 2) Feeling down, depressed, or hopeless: Not at all (0) [PHQ-2 Negative - No further assessment needed] : PHQ-2 Negative - No further assessment needed [de-identified] : walks [de-identified] : regular  [[id number]] : 0 [Patient reported mammogram was normal] : Patient reported mammogram was normal [Patient reported colonoscopy was normal] : Patient reported colonoscopy was normal [Change in mental status noted] : No change in mental status noted [None] : None [Alone] : lives alone [Employed] : employed [Single] : single [Sexually Active] : sexually active [Feels Safe at Home] : Feels safe at home [Reports changes in hearing] : Reports no changes in hearing [Reports changes in vision] : Reports no changes in vision [Reports changes in dental health] : Reports no changes in dental health [Smoke Detector] : smoke detector [Carbon Monoxide Detector] : carbon monoxide detector [Safety elements used in home] : safety elements used in home [Seat Belt] :  uses seat belt [MammogramDate] : 2023 [MammogramComments] : nl by blessing  [ColonoscopyDate] : 2021 [Never] : Never

## 2024-01-23 NOTE — HISTORY OF PRESENT ILLNESS
[FreeTextEntry1] : CPE    has  had a viral illness over the  last few weeks and 4 days ago , she developed stiffness in the hands and the feet  mild swelling  stiffness is worse in the morning , gets better in abt 1/2 hr  has a family h/o RA   the pt also has had more frequent canker sores in the last few months

## 2024-01-24 LAB
ALBUMIN SERPL ELPH-MCNC: 4.3 G/DL
ALP BLD-CCNC: 58 U/L
ALT SERPL-CCNC: 23 U/L
ANION GAP SERPL CALC-SCNC: 13 MMOL/L
AST SERPL-CCNC: 21 U/L
BILIRUB SERPL-MCNC: 0.2 MG/DL
BUN SERPL-MCNC: 13 MG/DL
CALCIUM SERPL-MCNC: 9.3 MG/DL
CHLORIDE SERPL-SCNC: 103 MMOL/L
CHOLEST SERPL-MCNC: 206 MG/DL
CO2 SERPL-SCNC: 22 MMOL/L
CREAT SERPL-MCNC: 0.62 MG/DL
EGFR: 113 ML/MIN/1.73M2
ERYTHROCYTE [SEDIMENTATION RATE] IN BLOOD BY WESTERGREN METHOD: 8 MM/HR
ESTIMATED AVERAGE GLUCOSE: 97 MG/DL
GLUCOSE SERPL-MCNC: 87 MG/DL
HBA1C MFR BLD HPLC: 5 %
HCT VFR BLD CALC: 39.9 %
HDLC SERPL-MCNC: 48 MG/DL
HGB BLD-MCNC: 13.1 G/DL
LDLC SERPL CALC-MCNC: 146 MG/DL
MCHC RBC-ENTMCNC: 30.2 PG
MCHC RBC-ENTMCNC: 32.8 GM/DL
MCV RBC AUTO: 91.9 FL
NONHDLC SERPL-MCNC: 158 MG/DL
PLATELET # BLD AUTO: 298 K/UL
POTASSIUM SERPL-SCNC: 4.5 MMOL/L
PROT SERPL-MCNC: 6.7 G/DL
RBC # BLD: 4.34 M/UL
RBC # FLD: 12.5 %
RHEUMATOID FACT SER QL: <10 IU/ML
SODIUM SERPL-SCNC: 139 MMOL/L
TRIGL SERPL-MCNC: 70 MG/DL
TSH SERPL-ACNC: 1.26 UIU/ML
WBC # FLD AUTO: 5.62 K/UL

## 2024-01-25 LAB
CCP AB SER IA-ACNC: <8 UNITS
RF+CCP IGG SER-IMP: NEGATIVE

## 2024-01-29 ENCOUNTER — APPOINTMENT (OUTPATIENT)
Dept: INTERNAL MEDICINE | Facility: CLINIC | Age: 44
End: 2024-01-29

## 2024-05-25 ENCOUNTER — NON-APPOINTMENT (OUTPATIENT)
Age: 44
End: 2024-05-25

## 2024-06-18 ENCOUNTER — APPOINTMENT (OUTPATIENT)
Dept: OBGYN | Facility: CLINIC | Age: 44
End: 2024-06-18

## 2024-06-18 PROCEDURE — 56820 COLPOSCOPY VULVA: CPT

## 2024-06-18 PROCEDURE — 99213 OFFICE O/P EST LOW 20 MIN: CPT | Mod: 25

## 2024-06-20 ENCOUNTER — APPOINTMENT (OUTPATIENT)
Dept: ULTRASOUND IMAGING | Facility: CLINIC | Age: 44
End: 2024-06-20
Payer: COMMERCIAL

## 2024-06-20 ENCOUNTER — APPOINTMENT (OUTPATIENT)
Dept: ULTRASOUND IMAGING | Facility: CLINIC | Age: 44
End: 2024-06-20

## 2024-06-20 ENCOUNTER — APPOINTMENT (OUTPATIENT)
Dept: ORTHOPEDIC SURGERY | Facility: CLINIC | Age: 44
End: 2024-06-20

## 2024-06-20 DIAGNOSIS — M79.662 PAIN IN LEFT LOWER LEG: ICD-10-CM

## 2024-06-20 PROCEDURE — ZZZZZ: CPT

## 2024-06-20 PROCEDURE — 93971 EXTREMITY STUDY: CPT | Mod: LT

## 2024-11-19 ENCOUNTER — APPOINTMENT (OUTPATIENT)
Dept: OBGYN | Facility: CLINIC | Age: 44
End: 2024-11-19

## 2024-11-19 PROCEDURE — 81002 URINALYSIS NONAUTO W/O SCOPE: CPT

## 2024-11-19 PROCEDURE — 99396 PREV VISIT EST AGE 40-64: CPT

## 2024-11-19 PROCEDURE — 99459 PELVIC EXAMINATION: CPT

## 2025-01-29 ENCOUNTER — NON-APPOINTMENT (OUTPATIENT)
Age: 45
End: 2025-01-29

## 2025-04-01 ENCOUNTER — APPOINTMENT (OUTPATIENT)
Dept: INTERNAL MEDICINE | Facility: CLINIC | Age: 45
End: 2025-04-01
Payer: COMMERCIAL

## 2025-04-01 VITALS — TEMPERATURE: 100.8 F

## 2025-04-01 VITALS
OXYGEN SATURATION: 98 % | DIASTOLIC BLOOD PRESSURE: 79 MMHG | BODY MASS INDEX: 28.28 KG/M2 | HEIGHT: 66 IN | WEIGHT: 176 LBS | HEART RATE: 94 BPM | SYSTOLIC BLOOD PRESSURE: 125 MMHG | TEMPERATURE: 98.8 F

## 2025-04-01 DIAGNOSIS — Z23 ENCOUNTER FOR IMMUNIZATION: ICD-10-CM

## 2025-04-01 DIAGNOSIS — R50.9 FEVER, UNSPECIFIED: ICD-10-CM

## 2025-04-01 DIAGNOSIS — Z00.00 ENCOUNTER FOR GENERAL ADULT MEDICAL EXAMINATION W/OUT ABNORMAL FINDINGS: ICD-10-CM

## 2025-04-01 PROCEDURE — 99396 PREV VISIT EST AGE 40-64: CPT

## 2025-04-01 PROCEDURE — 36415 COLL VENOUS BLD VENIPUNCTURE: CPT

## 2025-04-01 RX ORDER — BENZONATATE 100 MG/1
100 CAPSULE ORAL
Qty: 21 | Refills: 0 | Status: ACTIVE | COMMUNITY
Start: 2025-04-01 | End: 1900-01-01

## 2025-04-02 LAB
ABORH: NORMAL
ALBUMIN SERPL ELPH-MCNC: 4.4 G/DL
ALP BLD-CCNC: 72 U/L
ALT SERPL-CCNC: 11 U/L
ANION GAP SERPL CALC-SCNC: 13 MMOL/L
AST SERPL-CCNC: 19 U/L
BILIRUB SERPL-MCNC: 0.4 MG/DL
BUN SERPL-MCNC: 11 MG/DL
CALCIUM SERPL-MCNC: 9.1 MG/DL
CHLORIDE SERPL-SCNC: 101 MMOL/L
CHOLEST SERPL-MCNC: 199 MG/DL
CO2 SERPL-SCNC: 22 MMOL/L
CREAT SERPL-MCNC: 0.67 MG/DL
EGFRCR SERPLBLD CKD-EPI 2021: 110 ML/MIN/1.73M2
ESTIMATED AVERAGE GLUCOSE: 94 MG/DL
FLUAV RNA NPH QL NAA+NON-PROBE: DETECTED
GLUCOSE SERPL-MCNC: 88 MG/DL
HBA1C MFR BLD HPLC: 4.9 %
HCT VFR BLD CALC: 41.1 %
HDLC SERPL-MCNC: 77 MG/DL
HGB BLD-MCNC: 13.4 G/DL
LDLC SERPL-MCNC: 106 MG/DL
MCHC RBC-ENTMCNC: 29.5 PG
MCHC RBC-ENTMCNC: 32.6 G/DL
MCV RBC AUTO: 90.3 FL
NONHDLC SERPL-MCNC: 122 MG/DL
PLATELET # BLD AUTO: 245 K/UL
POTASSIUM SERPL-SCNC: 4.6 MMOL/L
PROT SERPL-MCNC: 6.9 G/DL
RBC # BLD: 4.55 M/UL
RBC # FLD: 13.5 %
RESP PATH DNA+RNA PNL NPH NAA+NON-PROBE: DETECTED
SARS-COV-2 RNA RESP QL NAA+PROBE: NOT DETECTED
SODIUM SERPL-SCNC: 137 MMOL/L
TRIGL SERPL-MCNC: 86 MG/DL
TSH SERPL-ACNC: 1.14 UIU/ML
WBC # FLD AUTO: 6.21 K/UL

## 2025-04-03 ENCOUNTER — TRANSCRIPTION ENCOUNTER (OUTPATIENT)
Age: 45
End: 2025-04-03

## 2025-04-03 ENCOUNTER — NON-APPOINTMENT (OUTPATIENT)
Age: 45
End: 2025-04-03

## 2025-04-09 ENCOUNTER — LABORATORY RESULT (OUTPATIENT)
Age: 45
End: 2025-04-09

## 2025-04-09 ENCOUNTER — APPOINTMENT (OUTPATIENT)
Dept: DERMATOLOGY | Facility: CLINIC | Age: 45
End: 2025-04-09
Payer: COMMERCIAL

## 2025-04-09 DIAGNOSIS — L81.4 OTHER MELANIN HYPERPIGMENTATION: ICD-10-CM

## 2025-04-09 DIAGNOSIS — L30.9 DERMATITIS, UNSPECIFIED: ICD-10-CM

## 2025-04-09 DIAGNOSIS — D22.9 MELANOCYTIC NEVI, UNSPECIFIED: ICD-10-CM

## 2025-04-09 DIAGNOSIS — L82.1 OTHER SEBORRHEIC KERATOSIS: ICD-10-CM

## 2025-04-09 DIAGNOSIS — Z12.83 ENCOUNTER FOR SCREENING FOR MALIGNANT NEOPLASM OF SKIN: ICD-10-CM

## 2025-04-09 PROCEDURE — 99213 OFFICE O/P EST LOW 20 MIN: CPT

## 2025-04-17 RX ORDER — HYDROCORTISONE 25 MG/G
2.5 OINTMENT TOPICAL
Qty: 1 | Refills: 0 | Status: ACTIVE | COMMUNITY
Start: 2025-04-17 | End: 1900-01-01

## 2025-04-29 ENCOUNTER — APPOINTMENT (OUTPATIENT)
Dept: DERMATOLOGY | Facility: CLINIC | Age: 45
End: 2025-04-29
Payer: COMMERCIAL

## 2025-04-29 DIAGNOSIS — L30.9 DERMATITIS, UNSPECIFIED: ICD-10-CM

## 2025-04-29 PROCEDURE — 99214 OFFICE O/P EST MOD 30 MIN: CPT

## 2025-04-29 RX ORDER — METRONIDAZOLE 7.5 MG/G
0.75 CREAM TOPICAL TWICE DAILY
Qty: 1 | Refills: 1 | Status: ACTIVE | COMMUNITY
Start: 2025-04-29 | End: 1900-01-01

## 2025-06-05 ENCOUNTER — NON-APPOINTMENT (OUTPATIENT)
Age: 45
End: 2025-06-05